# Patient Record
Sex: FEMALE
[De-identification: names, ages, dates, MRNs, and addresses within clinical notes are randomized per-mention and may not be internally consistent; named-entity substitution may affect disease eponyms.]

---

## 2020-10-01 ENCOUNTER — APPOINTMENT (OUTPATIENT)
Dept: OTOLARYNGOLOGY | Facility: CLINIC | Age: 40
End: 2020-10-01
Payer: COMMERCIAL

## 2020-10-01 VITALS — WEIGHT: 150 LBS | TEMPERATURE: 98.5 F | BODY MASS INDEX: 24.99 KG/M2 | HEIGHT: 65 IN

## 2020-10-01 DIAGNOSIS — K11.5 SIALOLITHIASIS: ICD-10-CM

## 2020-10-01 DIAGNOSIS — Z82.49 FAMILY HISTORY OF ISCHEMIC HEART DISEASE AND OTHER DISEASES OF THE CIRCULATORY SYSTEM: ICD-10-CM

## 2020-10-01 DIAGNOSIS — R22.1 LOCALIZED SWELLING, MASS AND LUMP, NECK: ICD-10-CM

## 2020-10-01 DIAGNOSIS — Z87.891 PERSONAL HISTORY OF NICOTINE DEPENDENCE: ICD-10-CM

## 2020-10-01 PROBLEM — Z00.00 ENCOUNTER FOR PREVENTIVE HEALTH EXAMINATION: Status: ACTIVE | Noted: 2020-10-01

## 2020-10-01 PROCEDURE — 31575 DIAGNOSTIC LARYNGOSCOPY: CPT

## 2020-10-01 PROCEDURE — 99203 OFFICE O/P NEW LOW 30 MIN: CPT | Mod: 25

## 2020-10-01 RX ORDER — MULTIVITAMIN
TABLET ORAL
Refills: 0 | Status: ACTIVE | COMMUNITY

## 2020-10-01 RX ORDER — AMOXICILLIN AND CLAVULANATE POTASSIUM 875; 125 MG/1; MG/1
875-125 TABLET, COATED ORAL
Qty: 14 | Refills: 0 | Status: ACTIVE | COMMUNITY
Start: 2020-10-01 | End: 1900-01-01

## 2020-10-01 NOTE — CONSULT LETTER
[Dear  ___] : Dear  [unfilled], [Consult Letter:] : I had the pleasure of evaluating your patient, [unfilled]. [Please see my note below.] : Please see my note below. [Consult Closing:] : Thank you very much for allowing me to participate in the care of this patient.  If you have any questions, please do not hesitate to contact me. [Sincerely,] : Sincerely, [FreeTextEntry3] : Rosetta Kumar MD\par

## 2020-10-01 NOTE — ASSESSMENT
[FreeTextEntry1] : She has a right neck mass which is consistent with an enlarged right submandibular gland. It is slightly firm and tender. There was no induration or cellulitis. I palpated a possible stone in the submandibular gland duct.  I reviewed the ultrasound findings. There were no masses on flexible laryngoscopy\par \par -Plan\par -Findings and management options were discussed with the patient.\par -hydration\par -massage of the gland\par -warm compresses/heating pad as needed\par -sialogogues (such as lemon drops) as needed unless they cause worsening of the swelling. \par -I am placing the patient on a course of antibiotics. We may add steroids depending on how she does\par -I am sending her for a CT scan with contrast of the neck for further evaluation to ensure there is no mass. I will discuss management depending on the results \par -follow up after the scan\par -call and return earlier if any problems or worsening symptoms

## 2020-10-01 NOTE — HISTORY OF PRESENT ILLNESS
[de-identified] : JYOTSNA WATKINS is a 40 year patient Here for evaluation for right submandibular gland swelling. On September 10, she noticed that the right submandibular gland was enlarged and a little tender. It then increased in size. She did not notice if it changed in size when she was eating. She saw her PCP on September 16. She said laboratory testing was normal. She has tried to stay hydrated and massaged the area. It has improved a little bit. She has not been on antibiotics. She saw her dentist on September 1 and that evaluation was okay. She had an ultrasound on September 23 which showed an enlarged right submandibular gland compared to the left. There were no suspicious lymph nodes or enlarged lymph nodes. She denies dysphasgia, throat pain, or voice change. She has no history of autoimmune disease or leukemia or lymphoma. Her great-grandmother may have had leukemia when she was older. She denies night sweats, fevers or unexplained weight loss. She does not smoke tobacco but does smoke marijuana occasionally.

## 2020-10-10 ENCOUNTER — APPOINTMENT (OUTPATIENT)
Dept: CT IMAGING | Facility: CLINIC | Age: 40
End: 2020-10-10
Payer: COMMERCIAL

## 2020-10-10 ENCOUNTER — OUTPATIENT (OUTPATIENT)
Dept: OUTPATIENT SERVICES | Facility: HOSPITAL | Age: 40
LOS: 1 days | End: 2020-10-10

## 2020-10-10 PROCEDURE — 70491 CT SOFT TISSUE NECK W/DYE: CPT | Mod: 26

## 2020-10-24 ENCOUNTER — TRANSCRIPTION ENCOUNTER (OUTPATIENT)
Age: 40
End: 2020-10-24